# Patient Record
Sex: FEMALE | Race: WHITE | Employment: FULL TIME | URBAN - METROPOLITAN AREA
[De-identification: names, ages, dates, MRNs, and addresses within clinical notes are randomized per-mention and may not be internally consistent; named-entity substitution may affect disease eponyms.]

---

## 2020-08-25 ENCOUNTER — HOSPITAL ENCOUNTER (EMERGENCY)
Age: 25
Discharge: HOME | End: 2020-08-25
Attending: EMERGENCY MEDICINE
Payer: COMMERCIAL

## 2020-08-25 ENCOUNTER — APPOINTMENT (OUTPATIENT)
Dept: GENERAL RADIOLOGY | Age: 25
End: 2020-08-25
Payer: COMMERCIAL

## 2020-08-25 VITALS
RESPIRATION RATE: 15 BRPM | OXYGEN SATURATION: 97 % | WEIGHT: 200 LBS | HEIGHT: 66 IN | TEMPERATURE: 98.2 F | SYSTOLIC BLOOD PRESSURE: 123 MMHG | BODY MASS INDEX: 32.14 KG/M2 | HEART RATE: 83 BPM | DIASTOLIC BLOOD PRESSURE: 89 MMHG

## 2020-08-25 PROCEDURE — 72170 X-RAY EXAM OF PELVIS: CPT

## 2020-08-25 PROCEDURE — 72220 X-RAY EXAM SACRUM TAILBONE: CPT

## 2020-08-25 PROCEDURE — 99283 EMERGENCY DEPT VISIT LOW MDM: CPT

## 2020-08-25 RX ORDER — NAPROXEN 500 MG/1
500 TABLET ORAL 2 TIMES DAILY PRN
Qty: 30 TABLET | Refills: 0 | Status: SHIPPED | OUTPATIENT
Start: 2020-08-25

## 2020-08-25 ASSESSMENT — PAIN DESCRIPTION - ONSET: ONSET: SUDDEN

## 2020-08-25 ASSESSMENT — PAIN DESCRIPTION - LOCATION: LOCATION: HIP

## 2020-08-25 ASSESSMENT — PAIN DESCRIPTION - PAIN TYPE: TYPE: ACUTE PAIN

## 2020-08-25 ASSESSMENT — PAIN DESCRIPTION - FREQUENCY: FREQUENCY: CONTINUOUS

## 2020-08-25 ASSESSMENT — PAIN SCALES - GENERAL: PAINLEVEL_OUTOF10: 9

## 2020-08-25 ASSESSMENT — PAIN DESCRIPTION - DESCRIPTORS: DESCRIPTORS: THROBBING;SHOOTING

## 2020-08-25 ASSESSMENT — PAIN DESCRIPTION - PROGRESSION: CLINICAL_PROGRESSION: NOT CHANGED

## 2020-08-25 NOTE — ED PROVIDER NOTES
status: Never Smoker   Substance and Sexual Activity    Alcohol use: Not Currently    Drug use: Never    Sexual activity: None   Lifestyle    Physical activity     Days per week: None     Minutes per session: None    Stress: None   Relationships    Social connections     Talks on phone: None     Gets together: None     Attends Mu-ism service: None     Active member of club or organization: None     Attends meetings of clubs or organizations: None     Relationship status: None    Intimate partner violence     Fear of current or ex partner: None     Emotionally abused: None     Physically abused: None     Forced sexual activity: None   Other Topics Concern    None   Social History Narrative    None       PHYSICAL EXAM    VITAL SIGNS: /89   Pulse 83   Temp 98.2 °F (36.8 °C) (Oral)   Resp 15   Ht 5' 6\" (1.676 m)   Wt 200 lb (90.7 kg)   LMP 08/12/2020 (Approximate)   SpO2 97%   BMI 32.28 kg/m²        Constitutional:  Awake, alert, in no acute distress. HENT:  Atraumatic,  Moist mucus membranes. Normal posterior pharynx. Eyes:  Pupils equal round and reactive to light, EOM intact. Neck: Supple, normal ROM. Cardiovascular:  Regular rate and rhythm, no murmurs/rubs/gallops  Respiratory:  No respiratory distress, clear to auscultation bilaterally. Abdomen:  Soft, non tender, bowel sounds present  Musculoskeletal:  No edema, no deformities. Back:   - No gross deformity, swelling, or discoloration.    - No Paralumbar tenderness without masses, fluctuance, warmth, or skin changes. Tenderness over the tailbone and posterior right hip area. No lateral hip pain.    - No localized midline bony tenderness.   - No change in pain with forward flexion  - Thigh and groin sensation is intact, able to abduct the thigh, extend the knee, flex the knee, dorsiflex the ankle, plantar flex the toes, dorsiflex the great toe, patellar and achilles reflexes are intact bilaterally  Integument:  Well hydrated, no rash, no pallor. Neurologic:  No obvious neurological deficits. Patient moves without difficulty or weakness. Motor & Sensation grossly intact. Vascular:  Distal pulses and capillary refill intact bilateral lower extremities      RADIOLOGY/PROCEDURES    Xr Pelvis (1-2 Views)    Result Date: 8/25/2020  EXAMINATION: ONE XRAY VIEW OF THE PELVIS; THREE XRAY VIEWS OF THE SACRUM/COCCYX 8/25/2020 1:58 pm COMPARISON: None. HISTORY: ORDERING SYSTEM PROVIDED HISTORY: trauma TECHNOLOGIST PROVIDED HISTORY: Reason for exam:->trauma; ORDERING SYSTEM PROVIDED HISTORY: fall TECHNOLOGIST PROVIDED HISTORY: Reason for exam:->fall FINDINGS: Pelvis: Anatomic alignment. No fracture. The hip joints appear normal.  The SI joints appear normal. Sacrum and coccyx: The sacrum and coccyx appear to be intact. Anatomic alignment. Unremarkable appearing pelvis, sacrum and coccyx     Xr Sacrum Coccyx (min 2 Views)    Result Date: 8/25/2020  EXAMINATION: ONE XRAY VIEW OF THE PELVIS; THREE XRAY VIEWS OF THE SACRUM/COCCYX 8/25/2020 1:58 pm COMPARISON: None. HISTORY: ORDERING SYSTEM PROVIDED HISTORY: trauma TECHNOLOGIST PROVIDED HISTORY: Reason for exam:->trauma; ORDERING SYSTEM PROVIDED HISTORY: fall TECHNOLOGIST PROVIDED HISTORY: Reason for exam:->fall FINDINGS: Pelvis: Anatomic alignment. No fracture. The hip joints appear normal.  The SI joints appear normal. Sacrum and coccyx: The sacrum and coccyx appear to be intact. Anatomic alignment. Unremarkable appearing pelvis, sacrum and coccyx       ED COURSE & MEDICAL DECISION MAKING       Vital signs and nursing notes reviewed during ED course. I have independently evaluated this patient. All pertinent Lab data and radiographic results reviewed with patient at bedside. The patient and/or the family were informed of the results of any tests/labs/imaging, the treatment plan, and time was allotted to answer questions. 49-year-old female who presented with tailbone injury. X-rays were performed of the pelvis, sacrum and coccyx which shows no acute osseous abnormality. Likely to be tailbone contusion. Instructed on symptomatic care, should follow-up outpatient with a primary care physician for a recheck. Clinical  IMPRESSION    Coccyx contusion      I recommend followup with primary care provider over the next several days for recheck. Diagnosis and plan discussed in detail with patient who understands and agrees. Patient agrees to return emergency department if symptoms worsen or any new symptoms develop.       (Please note the MDM and HPI sections of this note were completed with a voice recognition program.  Efforts were made to edit the dictations but occasionally words are mis-transcribed.)      Saige Adkins,   08/25/20 [id number]

## 2020-08-25 NOTE — ED TRIAGE NOTES
Pt reports slipped on wet steps on sat. and fell down 3 steps .  Denies hitting her head, landed on her butt and rt hip